# Patient Record
Sex: MALE | Race: WHITE | NOT HISPANIC OR LATINO
[De-identification: names, ages, dates, MRNs, and addresses within clinical notes are randomized per-mention and may not be internally consistent; named-entity substitution may affect disease eponyms.]

---

## 2022-12-22 ENCOUNTER — APPOINTMENT (OUTPATIENT)
Dept: ORTHOPEDIC SURGERY | Facility: CLINIC | Age: 52
End: 2022-12-22

## 2022-12-22 VITALS — BODY MASS INDEX: 45.1 KG/M2 | HEIGHT: 70 IN | WEIGHT: 315 LBS

## 2022-12-22 DIAGNOSIS — Z78.9 OTHER SPECIFIED HEALTH STATUS: ICD-10-CM

## 2022-12-22 DIAGNOSIS — E78.00 PURE HYPERCHOLESTEROLEMIA, UNSPECIFIED: ICD-10-CM

## 2022-12-22 DIAGNOSIS — I10 ESSENTIAL (PRIMARY) HYPERTENSION: ICD-10-CM

## 2022-12-22 DIAGNOSIS — M48.10 ANKYLOSING HYPEROSTOSIS [FORESTIER], SITE UNSPECIFIED: ICD-10-CM

## 2022-12-22 PROBLEM — Z00.00 ENCOUNTER FOR PREVENTIVE HEALTH EXAMINATION: Status: ACTIVE | Noted: 2022-12-22

## 2022-12-22 PROCEDURE — 72070 X-RAY EXAM THORAC SPINE 2VWS: CPT

## 2022-12-22 PROCEDURE — 99204 OFFICE O/P NEW MOD 45 MIN: CPT

## 2022-12-22 RX ORDER — SACUBITRIL AND VALSARTAN 97; 103 MG/1; MG/1
97-103 TABLET, FILM COATED ORAL
Refills: 0 | Status: ACTIVE | COMMUNITY

## 2022-12-22 RX ORDER — CARVEDILOL 25 MG/1
25 TABLET, FILM COATED ORAL
Refills: 0 | Status: ACTIVE | COMMUNITY

## 2022-12-22 RX ORDER — APIXABAN 5 MG/1
5 TABLET, FILM COATED ORAL
Refills: 0 | Status: ACTIVE | COMMUNITY

## 2022-12-22 RX ORDER — ATORVASTATIN CALCIUM 40 MG/1
40 TABLET, FILM COATED ORAL
Refills: 0 | Status: ACTIVE | COMMUNITY

## 2022-12-22 NOTE — HISTORY OF PRESENT ILLNESS
[Mid-back] : mid-back [Gradual] : gradual [0] : 0 [8] : 8 [Sharp] : sharp [Stabbing] : stabbing [Intermittent] : intermittent [Full time] : Work status: full time [de-identified] : Patient presents today with middle back pain for years with NKI. Denies previous treatments. Experiencing localized pain. Had GOUT last week, was on prednisone and felt some relief. Denies recent imaging.  [] : no [FreeTextEntry9] : prednisone [de-identified] : Construction

## 2022-12-22 NOTE — ASSESSMENT
[FreeTextEntry1] : Patient will begin Medrol.  Patient advised not to take any NSAIDs while taking the steroids. \par \par Patient will begin physical therapy. \par \par Recommend: - NSAID - Heating pad - Muscle relaxer - Core strengthening exercise - Hamstring stretching exercise Patient is given back rehabilitation exercise book. \par \par Follow up in 2 months

## 2023-02-16 ENCOUNTER — APPOINTMENT (OUTPATIENT)
Dept: ORTHOPEDIC SURGERY | Facility: CLINIC | Age: 53
End: 2023-02-16

## 2023-05-25 ENCOUNTER — APPOINTMENT (OUTPATIENT)
Dept: ORTHOPEDIC SURGERY | Facility: CLINIC | Age: 53
End: 2023-05-25
Payer: COMMERCIAL

## 2023-05-25 VITALS — BODY MASS INDEX: 45.1 KG/M2 | HEIGHT: 70 IN | WEIGHT: 315 LBS

## 2023-05-25 PROCEDURE — 20610 DRAIN/INJ JOINT/BURSA W/O US: CPT | Mod: LT

## 2023-05-25 PROCEDURE — 99214 OFFICE O/P EST MOD 30 MIN: CPT | Mod: 25

## 2023-05-25 PROCEDURE — 73030 X-RAY EXAM OF SHOULDER: CPT | Mod: LT

## 2023-05-25 NOTE — IMAGING
[Left] : left shoulder [There are no fractures, subluxations or dislocations. No significant abnormalities are seen] : There are no fractures, subluxations or dislocations. No significant abnormalities are seen [Type 2 acromion] : Type 2 acromion [AC Joint Arthrosis] : AC Joint Arthrosis [de-identified] : (Physical Exam: Shoulder)\par \par Laterality is left\par \par Patient is in no acute distress, alert and oriented\par Sensation is grossly intact to light touch in the hand\par Motor function is 5/5 in the hand\par Capillary refill is less than 2 seconds in the fingers\par Lymphadenopathy is not present\par Peripheral edema is not present\par \par Skin is intact \par Swelling is not present \par Atrophy is not present\par Scapular winging is not present\par Deformity of the AC joint is not present\par Deformity of the biceps is not present \par \par Bicipital groove tenderness is present \par AC joint tenderness is not present \par Scapulothoracic tenderness is not present \par Cervical paraspinal tenderness is not present \par \par Active forward elevation is 170\par Passive forward elevation is 175\par External rotation at the side is 80\par Internal rotation behind the back is to the level of T7 \par \par Forward elevation strength is 5-/5\par External rotation strength at the side is 5/5 \par Internal rotation strength at the side is 5/5 \par Deltoid strength of anterior, posterior and lateral heads is 5/5\par \par Wagner test is abnormal \par O’Franco’s test is abnormal\par Speed’s test is abnormal\par Cross body adduction test is normal\par Apprehension and relocation is normal\par Sulcus sign is normal\par Belly press test is normal\par Spurling’s test is normal\par  [FreeTextEntry1] : sclerosis of the greater tuberosity likely chronic. no fracture
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
## 2023-05-25 NOTE — DISCUSSION/SUMMARY
[de-identified] : (Assessment and Plan)\par \par History, clinical examination and imaging were most consistent with:\par -left rotator cuff partial tear and SLAP tear with biceps tendonitis\par \par The diagnosis was explained in detail. The potential non-surgical and surgical treatments were reviewed. The relative risks and benefits of each option were considered relative to the patient’s age, activity level, medical history, symptom severity and previously attempted treatments. \par \par The patient was advised to consult with their primary medical provider prior to initiation of any new medications to reduce the risk of adverse effects specific to their long-term home medications and medical history. The risk of gastrointestinal irritation and kidney injury specific to long-term NSAID use was discussed.   \par \par \par -Patient will proceed with formal PT.\par -Cortisone injection performed today for symptom relief.\par -Over the counter acetaminophen as needed for pain. \par -The added clinical utility of an MRI was discussed. The patient deferred further diagnostic testing at this time.\par -Follow up in 6 weeks. If symptoms persist consider MRI. \par \par \par (MDM) \par \par Problem Complexity\par -Moderate: chronic illness with exacerbation\par \par Risk\par -Moderate: injection\par \par -Patient has not been seen by another provider in my practice within the past 2 years who specializes in orthopedic sports medicine, shoulder or elbow surgery. \par \par (Procedure Note)\par \par Injection location was the:\par -left subacromial bursa and glenohumeral joint (half and half)\par \par Injection contents were: \par 40 mg of kenalog and 5 mL of 1% lidocaine\par \par Procedure Details: \par -Informed consent was obtained. Discussed possible risks of corticosteroid injection including hyperglycemia, infection and skin discoloration. \par -Discussed that due to infection risk, an interval between injection and any future surgery is 6 weeks for arthroscopy and 3 months for arthroplasty.\par -Injection performed using aseptic technique. Hemostasis was confirmed.\par -Procedure was tolerated well with no complications. \par \par

## 2023-07-06 ENCOUNTER — APPOINTMENT (OUTPATIENT)
Dept: ORTHOPEDIC SURGERY | Facility: CLINIC | Age: 53
End: 2023-07-06
Payer: COMMERCIAL

## 2023-07-06 VITALS — WEIGHT: 315 LBS | HEIGHT: 70 IN | BODY MASS INDEX: 45.1 KG/M2

## 2023-07-06 PROCEDURE — 99213 OFFICE O/P EST LOW 20 MIN: CPT

## 2023-07-06 NOTE — DISCUSSION/SUMMARY
[de-identified] : (Assessment and Plan)\par \par History, clinical examination and imaging were most consistent with:\par -left rotator cuff partial tear and SLAP tear with biceps tendonitis\par \par The diagnosis was explained in detail. The potential non-surgical and surgical treatments were reviewed. The relative risks and benefits of each option were considered relative to the patient’s age, activity level, medical history, symptom severity and previously attempted treatments. \par \par The patient was advised to consult with their primary medical provider prior to initiation of any new medications to reduce the risk of adverse effects specific to their long-term home medications and medical history. The risk of gastrointestinal irritation and kidney injury specific to long-term NSAID use was discussed. \par \par \par -Patient will continue with formal PT.\par -Over the counter acetaminophen as needed for pain. \par -Due to persistence of symptoms despite non-surgical treatment, an MRI will be obtained to evaluate his rotator cuff, biceps and labrum for tear. \par -Follow up when imaging completed. In the absence of full thickness rotator cuff tear we would likely discuss additional PT before considering surgical intervention. \par \par \par (MDM) \par \par Problem Complexity\par -Moderate: chronic illness with exacerbation\par \par Risk\par -Low: OTC medication\par

## 2023-07-06 NOTE — IMAGING
[de-identified] : (Physical Exam: Shoulder)\par \par Laterality is left\par \par Patient is in no acute distress, alert and oriented\par Sensation is grossly intact to light touch in the hand\par Motor function is 5/5 in the hand\par Capillary refill is less than 2 seconds in the fingers\par Lymphadenopathy is not present\par Peripheral edema is not present\par \par Skin is intact \par Swelling is not present \par Atrophy is not present\par Scapular winging is not present\par Deformity of the AC joint is not present\par Deformity of the biceps is not present \par \par Bicipital groove tenderness is present \par AC joint tenderness is not present \par Scapulothoracic tenderness is not present \par Cervical paraspinal tenderness is not present \par \par Active forward elevation is 175\par Passive forward elevation is 175\par External rotation at the side is 80\par Internal rotation behind the back is to the level of T7 \par \par Forward elevation strength is 5-/5\par External rotation strength at the side is 5/5 \par Internal rotation strength at the side is 5/5 \par Deltoid strength of anterior, posterior and lateral heads is 5/5\par \par Wagner test is abnormal \par O’Franco’s test is abnormal\par Speed’s test is abnormal\par Cross body adduction test is normal\par Apprehension and relocation is normal\par Sulcus sign is normal\par Belly press test is normal\par Spurling’s test is normal\par

## 2023-07-06 NOTE — HISTORY OF PRESENT ILLNESS
[de-identified] : Body part causing symptoms is the left shoulder\par Pain score at rest is 0\par Pain score during activity is 3\par Treatments since last visit include PT 2x a week and CSI last visit \par Compared to last visit, symptoms are improved\par He still has some pain with overhead activity

## 2023-07-14 ENCOUNTER — APPOINTMENT (OUTPATIENT)
Dept: MRI IMAGING | Facility: CLINIC | Age: 53
End: 2023-07-14

## 2023-07-18 ENCOUNTER — FORM ENCOUNTER (OUTPATIENT)
Age: 53
End: 2023-07-18

## 2023-07-19 ENCOUNTER — APPOINTMENT (OUTPATIENT)
Dept: MRI IMAGING | Facility: CLINIC | Age: 53
End: 2023-07-19
Payer: COMMERCIAL

## 2023-07-19 PROCEDURE — 73221 MRI JOINT UPR EXTREM W/O DYE: CPT | Mod: LT

## 2023-07-26 ENCOUNTER — APPOINTMENT (OUTPATIENT)
Dept: ORTHOPEDIC SURGERY | Facility: CLINIC | Age: 53
End: 2023-07-26
Payer: COMMERCIAL

## 2023-07-26 PROCEDURE — 99213 OFFICE O/P EST LOW 20 MIN: CPT

## 2023-07-26 NOTE — DATA REVIEWED
[MRI] : MRI [Left] : left [Shoulder] : shoulder [Report was reviewed and noted in the chart] : The report was reviewed and noted in the chart [I reviewed the films/CD and agree] : I reviewed the films/CD and agree [FreeTextEntry1] : full thickness rotator cuff supra tear with minimal retraction, no atrophy, infra and subscap inact, biceps tendonitis, labral fraying

## 2023-07-26 NOTE — HISTORY OF PRESENT ILLNESS
[de-identified] : Body part causing symptoms is the left shoulder\par Pain score at rest is 0/10\par Pain score during activity is 5/10\par Treatments since last visit include PT and tylenol\par Symptoms are improved, only has mild pain with lifting overhead\par \par \par  Impression: \par 1. Full-thickness tearing of the supraspinatus tendon insertion measuring 2 cm in anterior to posterior dimens\par with 1 cm of retraction and moderate surrounding bursitis without muscle atrophy.\par 2. Infraspinatus tendinitis with calcific deposits distally without discontinuity.\par 3. Mild effusion and capsular thickening, subscapularis tendinopathy, biceps tendinopathy, AC joint arthrosis\par and lateral acromial bone spurs with narrowing of the supraspinatus outlet.\par 4. No acute fracture.

## 2023-07-26 NOTE — IMAGING
[de-identified] : (Physical Exam: Shoulder)\par \par Laterality is left\par \par Patient is in no acute distress, alert and oriented\par Sensation is grossly intact to light touch in the hand\par Motor function is 5/5 in the hand\par Capillary refill is less than 2 seconds in the fingers\par Lymphadenopathy is not present\par Peripheral edema is not present\par \par Skin is intact \par Swelling is not present \par Atrophy is not present\par Scapular winging is not present\par Deformity of the AC joint is not present\par Deformity of the biceps is not present \par \par Bicipital groove tenderness is present \par AC joint tenderness is not present \par Scapulothoracic tenderness is not present \par Cervical paraspinal tenderness is not present \par \par Active forward elevation is 175\par Passive forward elevation is 175\par External rotation at the side is 80\par Internal rotation behind the back is to the level of T7 \par \par Forward elevation strength is 5-/5\par External rotation strength at the side is 5/5 \par Internal rotation strength at the side is 5/5 \par Deltoid strength of anterior, posterior and lateral heads is 5/5\par \par Wagner test is abnormal \par O’Franco’s test is abnormal\par Speed’s test is abnormal\par Cross body adduction test is normal\par Apprehension and relocation is normal\par Sulcus sign is normal\par Belly press test is normal\par Spurling’s test is normal\par

## 2023-07-26 NOTE — DISCUSSION/SUMMARY
[de-identified] : (Assessment and Plan)\par \par History, clinical examination and imaging were most consistent with:\par -left rotator cuff full thickness supraspinatus tear and biceps tendonitis\par \par The diagnosis was explained in detail. The potential non-surgical and surgical treatments were reviewed. The relative risks and benefits of each option were considered relative to the patient’s age, activity level, medical history, symptom severity and previously attempted treatments. \par \par The patient was advised to consult with their primary medical provider prior to initiation of any new medications to reduce the risk of adverse effects specific to their long-term home medications and medical history. The risk of gastrointestinal irritation and kidney injury specific to long-term NSAID use was discussed. \par \par -Discussed option for rotator cuff repair, and associated rehabilitation. Patient would like to avoid surgical intervention at this time while he continues to work, as the symptoms are not functionally limiting. We discussed risk of atrophy and tear progression over time. \par -Patient will continue with formal PT and transition to HEP. \par -Over the counter acetaminophen as needed for pain.\par -Follow up in 3 months, consider repeat injection versus surgical discussion. Patient would consider surgery over the winter if symptoms worsen. \par \par (MDM) \par \par Problem Complexity\par -Moderate: chronic illness with exacerbation\par \par Risk\par -Low: OTC medication\par

## 2023-10-25 ENCOUNTER — APPOINTMENT (OUTPATIENT)
Dept: ORTHOPEDIC SURGERY | Facility: CLINIC | Age: 53
End: 2023-10-25

## 2024-02-07 ENCOUNTER — APPOINTMENT (OUTPATIENT)
Dept: ORTHOPEDIC SURGERY | Facility: CLINIC | Age: 54
End: 2024-02-07

## 2024-03-07 ENCOUNTER — APPOINTMENT (OUTPATIENT)
Dept: ORTHOPEDIC SURGERY | Facility: CLINIC | Age: 54
End: 2024-03-07
Payer: COMMERCIAL

## 2024-03-07 VITALS — HEIGHT: 69 IN | WEIGHT: 307 LBS | BODY MASS INDEX: 45.47 KG/M2

## 2024-03-07 PROCEDURE — 99214 OFFICE O/P EST MOD 30 MIN: CPT

## 2024-03-07 NOTE — DISCUSSION/SUMMARY
[de-identified] : History, clinical examination and imaging were most consistent with: -left rotator cuff full thickness supraspinatus tear and biceps tendonitis   The diagnosis was explained in detail. The potential non-surgical and surgical treatments were reviewed. The relative risks and benefits of each option were considered relative to the patients age, activity level, medical history, symptom severity and previously attempted treatments.   -Surgical treatment was selected. The patient failed to improve with non-surgical treatment. Their symptoms have placed a significant burden on their quality of life. The risks, benefits and alternatives of the planned surgical procedure were discussed, along with the expected timeline for rehabilitation and specifics regarding the most common complications. The patient agreed to participate in post-operative physical therapy. 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 Recommend cervical evaluation with Dr. Metz pre-operatively to evaluate for precautions within the beach chair position.    -Procedure: left shoulder RCR, SAD, ANETTE, BT -Clearances: medical, cardiac for afib -Diabetic: no -Smoker: no -Follow up: 1-2 weeks after surgery   (MDM)   Problem Complexity -Moderate: chronic illness with exacerbation   Risk -Moderate: pre-surgical discussion

## 2024-03-07 NOTE — IMAGING
[de-identified] : (Physical Exam: Shoulder)  Laterality is left  Patient is in no acute distress, alert and oriented Sensation is grossly intact to light touch in the hand Motor function is 5/5 in the hand Capillary refill is less than 2 seconds in the fingers Lymphadenopathy is not present Peripheral edema is not present  Skin is intact  Swelling is not present  Atrophy is not present Scapular winging is not present Deformity of the AC joint is not present Deformity of the biceps is not present   Bicipital groove tenderness is present  AC joint tenderness is not present  Scapulothoracic tenderness is not present  Cervical paraspinal tenderness is not present   Active forward elevation is 120 Passive forward elevation is 160 External rotation at the side is 80 Internal rotation behind the back is to the level of T7   Forward elevation strength is 4+/5 External rotation strength at the side is 5-/5  Internal rotation strength at the side is 5/5  Deltoid strength of anterior, posterior and lateral heads is 5/5  Wagner test is abnormal  [place]'s test is abnormal Speed's test is abnormal Cross body adduction test is normal Apprehension and relocation is normal Sulcus sign is normal Belly press test is normal Spurling's test is normal

## 2024-03-07 NOTE — HISTORY OF PRESENT ILLNESS
[de-identified] : 03/07/24: Follow up for the left shoulder. Patient is still experiencing pain, he has stopped attending PT due to pain. worsening weakness.    Body part causing symptoms is the left shoulder Pain score at rest is 0/10 Pain score during activity is 5/10 Treatments since last visit include PT and tylenol Symptoms are improved, only has mild pain with lifting overhead   Impression:  1. Full-thickness tearing of the supraspinatus tendon insertion measuring 2 cm in anterior to posterior dimens with 1 cm of retraction and moderate surrounding bursitis without muscle atrophy. 2. Infraspinatus tendinitis with calcific deposits distally without discontinuity. 3. Mild effusion and capsular thickening, subscapularis tendinopathy, biceps tendinopathy, AC joint arthrosis and lateral acromial bone spurs with narrowing of the supraspinatus outlet. 4. No acute fracture.

## 2024-03-07 NOTE — DATA REVIEWED
[Left] : left [MRI] : MRI [Report was reviewed and noted in the chart] : The report was reviewed and noted in the chart [Shoulder] : shoulder [I reviewed the films/CD and agree] : I reviewed the films/CD and agree [FreeTextEntry1] : full thickness rotator cuff supra tear with minimal retraction, no atrophy, infra and subscap inact, biceps tendonitis, labral fraying

## 2024-04-03 ENCOUNTER — APPOINTMENT (OUTPATIENT)
Dept: ORTHOPEDIC SURGERY | Facility: CLINIC | Age: 54
End: 2024-04-03
Payer: COMMERCIAL

## 2024-04-03 DIAGNOSIS — M48.02 SPINAL STENOSIS, CERVICAL REGION: ICD-10-CM

## 2024-04-03 DIAGNOSIS — M62.838 OTHER MUSCLE SPASM: ICD-10-CM

## 2024-04-03 DIAGNOSIS — M47.22 OTHER SPONDYLOSIS WITH RADICULOPATHY, CERVICAL REGION: ICD-10-CM

## 2024-04-03 DIAGNOSIS — M47.812 SPONDYLOSIS W/OUT MYELOPATHY OR RADICULOPATHY, CERVICAL REGION: ICD-10-CM

## 2024-04-03 PROCEDURE — 99214 OFFICE O/P EST MOD 30 MIN: CPT

## 2024-04-03 PROCEDURE — 72040 X-RAY EXAM NECK SPINE 2-3 VW: CPT

## 2024-04-03 NOTE — PHYSICAL EXAM
[de-identified] : Constitutional: Well groomed and developed.  Respiratory: Normal, unlabored breathing. No use of accessory muscles.  Skin: No rashes or ulcers. Skin warm and dry.  Psychiatric: Oriented to time, place, person and event. No acute distress.   Neck:    Posture: normal   AROM:  Flexion- chin to chest  Extension- 20  R rotation- 60  L rotation- 60  Moderate pain with neck ROM.  Gait: Heel to toe Patient able to walk on toes and heels.   Tenderness:  Cervical: Mild tenderness on palpation    Motor:                        R               L              UE:                   Deltoid            5                4 WE                 5                5 Triceps          5                5                5                5 Intrinsics       5                5 Biceps          5                5                                  R         L DTR:  Biceps:                     2+        2+ Brachioradialis:        2+        2+ Triceps:                   2+         2+   Sensory: Light touch sensation intact on C5-T1  Spurling sign: Normal  Babinski's sign: Negative Bilaterally Anthony's sign: Negative Bilaterally  Abduction sign: Negative Bilaterally

## 2024-04-03 NOTE — HISTORY OF PRESENT ILLNESS
[Neck] : neck [7] : 7 [5] : 5 [Dull/Aching] : dull/aching [Constant] : constant [Standing] : standing [Full time] : Work status: full time [de-identified] : Patient presents today with neck pain with NKI. Patient denies previous treatments. Patient saw another orthopedic and was advised to go to PT. Patient states his pain is localized. Patient denies having headaches or issues with his balance. Patient denies taking pain medication. Patient had thoracic spine MRI at . [] : no [de-identified] :  thoracic spine MRI at  [de-identified] : [employer]

## 2024-04-03 NOTE — ASSESSMENT
[FreeTextEntry1] : 55 yo male presents today for eval of his neck pain. X-rays today show mild DDD. Scheduled for rotator cuff surgery with [facility] in July. From a neck standpoint, no contraindications for planned shoulder surgery. I discussed with patient that for his neck pain, he may proceed with PT in the meantime until surgery.   - Recommend physical therapy to regain range of motion, strengthening and symptomatic improvement. Prescription given in office today.   - Recommend NSAIDs PRN - Recommend heating pad use to decrease muscle spasm - Discussed the importance of home exercises, including but not limited to neck stretching and cervical traction   Patient was educated on their diagnosis today. All questions answered and patient expressed understanding.  Follow up PRN

## 2024-05-01 ENCOUNTER — APPOINTMENT (OUTPATIENT)
Dept: PULMONOLOGY | Facility: CLINIC | Age: 54
End: 2024-05-01
Payer: COMMERCIAL

## 2024-05-01 VITALS
HEART RATE: 73 BPM | RESPIRATION RATE: 16 BRPM | WEIGHT: 286 LBS | SYSTOLIC BLOOD PRESSURE: 124 MMHG | HEIGHT: 69 IN | DIASTOLIC BLOOD PRESSURE: 64 MMHG | OXYGEN SATURATION: 96 % | BODY MASS INDEX: 42.36 KG/M2

## 2024-05-01 DIAGNOSIS — I42.9 CARDIOMYOPATHY, UNSPECIFIED: ICD-10-CM

## 2024-05-01 DIAGNOSIS — I48.91 UNSPECIFIED ATRIAL FIBRILLATION: ICD-10-CM

## 2024-05-01 DIAGNOSIS — E78.00 PURE HYPERCHOLESTEROLEMIA, UNSPECIFIED: ICD-10-CM

## 2024-05-01 DIAGNOSIS — M10.9 GOUT, UNSPECIFIED: ICD-10-CM

## 2024-05-01 PROCEDURE — 99204 OFFICE O/P NEW MOD 45 MIN: CPT

## 2024-05-01 RX ORDER — METHYLPREDNISOLONE 4 MG/1
4 TABLET ORAL
Qty: 1 | Refills: 0 | Status: DISCONTINUED | COMMUNITY
Start: 2022-12-22 | End: 2024-05-01

## 2024-05-01 NOTE — HISTORY OF PRESENT ILLNESS
[Awakes Unrefreshed] : awakes unrefreshed [Awakes with Dry Mouth] : does not awaken with dry mouth [Awakes with Headache] : does not awaken with headache [Snoring] : snoring [Witnessed Apneas] : witnessed apneas [TextBox_77] : 8526 [TextBox_79] : 0170 [TextBox_81] : 10 [TextBox_83] : 1hr [TextBox_89] : 1 [TextBox_165] : Dx ADWOA 6yrs Failed cpap [ESS] : 8

## 2024-05-01 NOTE — DISCUSSION/SUMMARY
[Obstructive Sleep Apnea] : obstructive sleep apnea [Sleep Study] : sleep study [Alcohol Avoidance] : alcohol avoidance [Sedative Avoidance] : sedative avoidance [Smoking Cessation] : smoking cessation [Intra-Oral Device] : intra-oral device [de-identified] : unattended sleep study

## 2024-05-01 NOTE — CONSULT LETTER
[Dear  ___] : Dear  [unfilled], [Consult Letter:] : I had the pleasure of evaluating your patient, [unfilled]. [Please see my note below.] : Please see my note below. [Consult Closing:] : Thank you very much for allowing me to participate in the care of this patient.  If you have any questions, please do not hesitate to contact me. [Sincerely,] : Sincerely, [FreeTextEntry3] : Gamal Almeida MD FCCP Pulmonary/Critical Care/Sleep Medicine Department of Internal Medicine  [facility]

## 2024-05-15 ENCOUNTER — OUTPATIENT (OUTPATIENT)
Dept: OUTPATIENT SERVICES | Facility: HOSPITAL | Age: 54
LOS: 1 days | End: 2024-05-15
Payer: COMMERCIAL

## 2024-05-15 ENCOUNTER — TRANSCRIPTION ENCOUNTER (OUTPATIENT)
Age: 54
End: 2024-05-15

## 2024-05-15 DIAGNOSIS — G47.33 OBSTRUCTIVE SLEEP APNEA (ADULT) (PEDIATRIC): ICD-10-CM

## 2024-05-15 PROCEDURE — 95800 SLP STDY UNATTENDED: CPT

## 2024-05-15 PROCEDURE — 95800 SLP STDY UNATTENDED: CPT | Mod: 26

## 2024-05-23 ENCOUNTER — APPOINTMENT (OUTPATIENT)
Dept: PULMONOLOGY | Facility: CLINIC | Age: 54
End: 2024-05-23
Payer: COMMERCIAL

## 2024-05-23 DIAGNOSIS — G47.33 OBSTRUCTIVE SLEEP APNEA (ADULT) (PEDIATRIC): ICD-10-CM

## 2024-05-23 PROCEDURE — 99213 OFFICE O/P EST LOW 20 MIN: CPT

## 2024-05-23 RX ORDER — ZOLPIDEM TARTRATE 10 MG/1
10 TABLET ORAL
Qty: 1 | Refills: 0 | Status: ACTIVE | OUTPATIENT
Start: 2024-05-23

## 2024-05-23 NOTE — DISCUSSION/SUMMARY
[FreeTextEntry1] : 54-year-old male seen today for the above.  Patient's findings on unattended sleep study very mild.  Because of the patient's history of atrial fibrillation I feel that he should undergo an attended sleep study due to the under predictive tendency of unattended studies.

## 2024-05-23 NOTE — HISTORY OF PRESENT ILLNESS
[Medical Office: ([facility])___] : at the medical office located in  [Verbal consent obtained from patient] : the patient, [unfilled] [TextBox_4] : Recurrent A-fib status post ablation [Awakes Unrefreshed] : awakes unrefreshed [Awakes with Dry Mouth] : does not awaken with dry mouth [Awakes with Headache] : does not awaken with headache [Snoring] : snoring [Witnessed Apneas] : witnessed apneas [Home] : home [TextBox_77] : 0176 [TextBox_79] : 7286 [TextBox_81] : 10 [TextBox_83] : 1hr [TextBox_89] : 1 [TextBox_100] : 5/15/2024 [TextBox_108] : 7.2 (3% criteria) [TextBox_120] : 4.4 AHI at 4% criteria, total sleep time 6003 minutes [TextBox_165] : Dx ADWOA 6yrs Failed cpap [ESS] : 8

## 2024-05-23 NOTE — CONSULT LETTER
[Dear  ___] : Dear  [unfilled], [Consult Letter:] : I had the pleasure of evaluating your patient, [unfilled]. [Please see my note below.] : Please see my note below. [Consult Closing:] : Thank you very much for allowing me to participate in the care of this patient.  If you have any questions, please do not hesitate to contact me. [Sincerely,] : Sincerely, [FreeTextEntry3] : Gamal Almeida MD FCCP Pulmonary/Critical Care/Sleep Medicine Department of Internal Medicine  [facility]

## 2024-05-23 NOTE — END OF VISIT
[FreeTextEntry3] : Visit initiated at the request of the patient or caregiver. I discussed with patient  the limitations of telemedicine encounters, including risks associated with the technology platform, technical difficulties, data security, and a limited physical exam. There is also a limitation in performing diagnostic procedures and patient may need further testing and workup to arrive at a diagnosis or treatment plan. We discussed this will be billed as a visit.  Over 50% of the visit was spent on counseling and coordination of care. [Time Spent: ___ minutes] : I have spent [unfilled] minutes of time on the encounter.

## 2024-06-05 ENCOUNTER — APPOINTMENT (OUTPATIENT)
Dept: ORTHOPEDIC SURGERY | Facility: CLINIC | Age: 54
End: 2024-06-05

## 2024-06-13 ENCOUNTER — APPOINTMENT (OUTPATIENT)
Dept: ORTHOPEDIC SURGERY | Facility: CLINIC | Age: 54
End: 2024-06-13
Payer: COMMERCIAL

## 2024-06-13 VITALS — BODY MASS INDEX: 41.18 KG/M2 | HEIGHT: 69 IN | WEIGHT: 278 LBS

## 2024-06-13 DIAGNOSIS — M75.122 COMPLETE ROTATOR CUFF TEAR OR RUPTURE OF LEFT SHOULDER, NOT SPECIFIED AS TRAUMATIC: ICD-10-CM

## 2024-06-13 PROCEDURE — 99214 OFFICE O/P EST MOD 30 MIN: CPT

## 2024-06-13 RX ORDER — TIRZEPATIDE 15 MG/.5ML
15 INJECTION, SOLUTION SUBCUTANEOUS
Refills: 0 | Status: ACTIVE | COMMUNITY

## 2024-06-13 RX ORDER — ASPIRIN 81 MG
81 TABLET, DELAYED RELEASE (ENTERIC COATED) ORAL
Refills: 0 | Status: ACTIVE | COMMUNITY

## 2024-06-13 NOTE — IMAGING
[de-identified] : (Physical Exam: Shoulder)  Laterality is left  Patient is in no acute distress, alert and oriented Sensation is grossly intact to light touch in the hand Motor function is 5/5 in the hand Capillary refill is less than 2 seconds in the fingers Lymphadenopathy is not present Peripheral edema is not present  Skin is intact  Swelling is not present  Atrophy is not present Scapular winging is not present Deformity of the AC joint is not present Deformity of the biceps is not present   Bicipital groove tenderness is present  AC joint tenderness is not present  Scapulothoracic tenderness is not present  Cervical paraspinal tenderness is not present   Active forward elevation is 120 Passive forward elevation is 160 External rotation at the side is 80 Internal rotation behind the back is to the level of T7   Forward elevation strength is 4+/5 External rotation strength at the side is 5-/5  Internal rotation strength at the side is 5/5  Deltoid strength of anterior, posterior and lateral heads is 5/5  Wagner test is abnormal  [place]'s test is abnormal Speed's test is abnormal Cross body adduction test is normal Apprehension and relocation is normal Sulcus sign is normal Belly press test is normal Spurling's test is normal

## 2024-06-13 NOTE — HISTORY OF PRESENT ILLNESS
[de-identified] : 06/13/24: Follow up for the left shoulder. Patient reports he is still having pain. Patient is scheduled for surgery on 07/17. he has additional questions about the surgery today. he saw dr rojo and cervical spine cleared for surgery.   03/07/24: Follow up for the left shoulder. Patient is still experiencing pain, he has stopped attending PT due to pain. worsening weakness.    Body part causing symptoms is the left shoulder Pain score at rest is 0/10 Pain score during activity is 5/10 Treatments since last visit include PT and tylenol Symptoms are improved, only has mild pain with lifting overhead   Impression:  1. Full-thickness tearing of the supraspinatus tendon insertion measuring 2 cm in anterior to posterior dimens with 1 cm of retraction and moderate surrounding bursitis without muscle atrophy. 2. Infraspinatus tendinitis with calcific deposits distally without discontinuity. 3. Mild effusion and capsular thickening, subscapularis tendinopathy, biceps tendinopathy, AC joint arthrosis and lateral acromial bone spurs with narrowing of the supraspinatus outlet. 4. No acute fracture.

## 2024-06-13 NOTE — DISCUSSION/SUMMARY
[de-identified] : History, clinical examination and imaging were most consistent with: -left rotator cuff full thickness supraspinatus tear and biceps tendonitis   The diagnosis was explained in detail. The potential non-surgical and surgical treatments were reviewed. The relative risks and benefits of each option were considered relative to the patients age, activity level, medical history, symptom severity and previously attempted treatments.   -Plan to proceed with surgery as scheduled with left shoulder RCR, SAD, ANETTE, BT. -Over the counter tylenol as needed for pain. -Follow up post-operatively.    (MDM)   Problem Complexity -Moderate: chronic illness with exacerbation   Risk -Moderate: pre-surgical discussion

## 2024-07-16 RX ORDER — OXYCODONE 5 MG/1
5 TABLET ORAL EVERY 6 HOURS
Qty: 20 | Refills: 0 | Status: ACTIVE | COMMUNITY
Start: 2024-07-16 | End: 1900-01-01

## 2024-07-16 RX ORDER — FAMOTIDINE 20 MG/1
20 TABLET, FILM COATED ORAL
Qty: 14 | Refills: 0 | Status: ACTIVE | COMMUNITY
Start: 2024-07-16 | End: 1900-01-01

## 2024-07-16 RX ORDER — POLYETHYLENE GLYCOL 3350 17 G/17G
17 POWDER, FOR SOLUTION ORAL DAILY
Qty: 7 | Refills: 0 | Status: ACTIVE | COMMUNITY
Start: 2024-07-16 | End: 1900-01-01

## 2024-07-17 ENCOUNTER — APPOINTMENT (OUTPATIENT)
Dept: ORTHOPEDIC SURGERY | Facility: HOSPITAL | Age: 54
End: 2024-07-17

## 2024-07-31 ENCOUNTER — APPOINTMENT (OUTPATIENT)
Dept: ORTHOPEDIC SURGERY | Facility: HOSPITAL | Age: 54
End: 2024-07-31
Payer: COMMERCIAL

## 2024-07-31 PROCEDURE — 29826 SHO ARTHRS SRG DECOMPRESSION: CPT | Mod: LT

## 2024-07-31 PROCEDURE — 23430 REPAIR BICEPS TENDON: CPT | Mod: LT

## 2024-07-31 PROCEDURE — 29827 SHO ARTHRS SRG RT8TR CUF RPR: CPT | Mod: LT

## 2024-07-31 PROCEDURE — 29823 SHO ARTHRS SRG XTNSV DBRDMT: CPT | Mod: LT

## 2024-08-01 ENCOUNTER — APPOINTMENT (OUTPATIENT)
Dept: ORTHOPEDIC SURGERY | Facility: CLINIC | Age: 54
End: 2024-08-01

## 2024-08-20 ENCOUNTER — APPOINTMENT (OUTPATIENT)
Dept: ORTHOPEDIC SURGERY | Facility: CLINIC | Age: 54
End: 2024-08-20
Payer: COMMERCIAL

## 2024-08-20 DIAGNOSIS — Z47.89 ENCOUNTER FOR OTHER ORTHOPEDIC AFTERCARE: ICD-10-CM

## 2024-08-20 PROCEDURE — 99024 POSTOP FOLLOW-UP VISIT: CPT

## 2024-08-20 NOTE — DISCUSSION/SUMMARY
[de-identified] : Patient is making expected post-operative progress. All questions were answered.   -Proceed with physical therapy as per the post-operative protocol. -Continue sling until 6 weeks post-operatively. -Ibuprofen and acetaminophen as needed for pain. -Aspirin for DVT prophylaxis until 1 month post-operatively -Follow up in 6 weeks without XR

## 2024-08-20 NOTE — HISTORY OF PRESENT ILLNESS
[de-identified] : Date of surgery was 07/31/2024 Surgery performed was left RCR, SAD, ANETTE, BT Current pain score is 5/10 Current medications taken are  aspirin, oxycodone prn at night Physical therapy is not started yet

## 2024-08-20 NOTE — IMAGING
[de-identified] : Laterality is left   Patient is in no acute distress, alert and oriented Sensation is grossly intact to light touch in the hand Axillary sensation is intact in the shoulder Motor function is 5/5 in the hand Deltoid and biceps are firing Capillary refill is less than 2 seconds in the fingers Lymphadenopathy is not present Peripheral edema is not present   Skin is intact Incisions are healing well Swelling is minimal

## 2024-09-15 ENCOUNTER — OUTPATIENT (OUTPATIENT)
Dept: OUTPATIENT SERVICES | Facility: HOSPITAL | Age: 54
LOS: 1 days | End: 2024-09-15
Payer: COMMERCIAL

## 2024-09-15 DIAGNOSIS — G47.33 OBSTRUCTIVE SLEEP APNEA (ADULT) (PEDIATRIC): ICD-10-CM

## 2024-09-15 PROCEDURE — 95810 POLYSOM 6/> YRS 4/> PARAM: CPT

## 2024-09-15 PROCEDURE — 95811 POLYSOM 6/>YRS CPAP 4/> PARM: CPT | Mod: 26

## 2024-09-26 ENCOUNTER — APPOINTMENT (OUTPATIENT)
Dept: PULMONOLOGY | Facility: CLINIC | Age: 54
End: 2024-09-26
Payer: COMMERCIAL

## 2024-09-26 VITALS
HEART RATE: 93 BPM | WEIGHT: 253 LBS | RESPIRATION RATE: 16 BRPM | SYSTOLIC BLOOD PRESSURE: 118 MMHG | HEIGHT: 69 IN | DIASTOLIC BLOOD PRESSURE: 80 MMHG | BODY MASS INDEX: 37.47 KG/M2 | OXYGEN SATURATION: 97 %

## 2024-09-26 DIAGNOSIS — Z71.89 OTHER SPECIFIED COUNSELING: ICD-10-CM

## 2024-09-26 DIAGNOSIS — G47.33 OBSTRUCTIVE SLEEP APNEA (ADULT) (PEDIATRIC): ICD-10-CM

## 2024-09-26 PROCEDURE — G2211 COMPLEX E/M VISIT ADD ON: CPT | Mod: NC

## 2024-09-26 PROCEDURE — 99214 OFFICE O/P EST MOD 30 MIN: CPT

## 2024-09-26 NOTE — HISTORY OF PRESENT ILLNESS
[Awakes Unrefreshed] : awakes unrefreshed [Snoring] : snoring [Witnessed Apneas] : witnessed apneas [Home] : home [TextBox_4] : Recurrent A-fib status post ablation [Awakes with Dry Mouth] : does not awaken with dry mouth [Awakes with Headache] : does not awaken with headache [TextBox_77] : 5801 [TextBox_79] : 9007 [TextBox_81] : 10 [TextBox_83] : 1hr [TextBox_89] : 1 [TextBox_100] : 9/15/2024 [TextBox_108] : 16.5(REM43.3) [TextBox_112] : 0.8% [TextBox_116] : 85 [TextBox_120] : TST 309minutes [TextBox_165] : Dx ADWOA 6yrs Failed cpap [ESS] : 8

## 2024-09-26 NOTE — CONSULT LETTER
[Dear  ___] : Dear  [unfilled], [Consult Letter:] : I had the pleasure of evaluating your patient, [unfilled]. [Please see my note below.] : Please see my note below. [Consult Closing:] : Thank you very much for allowing me to participate in the care of this patient.  If you have any questions, please do not hesitate to contact me. [Sincerely,] : Sincerely, [FreeTextEntry3] : Gamal Almeida MD FCCP Pulmonary/Critical Care/Sleep Medicine Department of Internal Medicine  [facility]

## 2024-09-26 NOTE — DISCUSSION/SUMMARY
[Obstructive Sleep Apnea] : obstructive sleep apnea [Moderate] : moderate in severity [None] : There are no changes in medication management [Alcohol Avoidance] : alcohol avoidance [Sedative Avoidance] : sedative avoidance [Weight Loss Program] : weight loss program [CPAP] : CPAP [de-identified] : CPAP was initiated with full explanation of the physiology behind treatment, compliance requirements and care of equipment. [FreeTextEntry1] : Pt fitted with and supplied with a Quikly F40 SW FFM

## 2024-10-03 ENCOUNTER — APPOINTMENT (OUTPATIENT)
Dept: ORTHOPEDIC SURGERY | Facility: CLINIC | Age: 54
End: 2024-10-03
Payer: COMMERCIAL

## 2024-10-03 DIAGNOSIS — Z47.89 ENCOUNTER FOR OTHER ORTHOPEDIC AFTERCARE: ICD-10-CM

## 2024-10-03 DIAGNOSIS — M75.122 COMPLETE ROTATOR CUFF TEAR OR RUPTURE OF LEFT SHOULDER, NOT SPECIFIED AS TRAUMATIC: ICD-10-CM

## 2024-10-03 PROCEDURE — 99024 POSTOP FOLLOW-UP VISIT: CPT

## 2024-10-03 NOTE — HISTORY OF PRESENT ILLNESS
[de-identified] : 10/03/2024: f/u for left shoulder. PT 2x a week and HEP. Symptoms are improved since last visit, but reports shoulder is still stiff. he recalls he was very stiff preop  Date of surgery was 07/31/2024 Surgery performed was left RCR, SAD, ANETTE, BT Current pain score is 5/10 Current medications taken are aspirin, oxycodone prn at night Physical therapy is not started yet

## 2024-10-03 NOTE — DISCUSSION/SUMMARY
[de-identified] : (Impression) Patient is making expected post-operative progress. Discussed importance of PT for additional stretching to improve his mobility.   (Plan)  -Physical therapy as per the post-operative protocol. -Acetaminophen as needed for pain. -Follow up in 8 weeks, consider CSI if stiffness persists.

## 2024-10-03 NOTE — IMAGING
[de-identified] : (Exam: Shoulder)  Laterality is left   Patient is in no acute distress, alert and oriented Sensation is grossly intact to light touch in the hand Axillary sensation is intact in the shoulder Motor function is 5/5 in the hand Deltoid and biceps are firing Capillary refill is less than 2 seconds in the fingers Lymphadenopathy is not present Peripheral edema is not present   Skin is intact Incisions are healed   Active forward elevation is 80 Passive forward elevation is 120 External rotation at the side is 30 Internal rotation behind the back is to the level of sacrum   Forward elevation strength is 4/5 External rotation strength at the side is 4/5 Internal rotation strength at the side is 4/5 Deltoid anterior, posterior and lateral heads are firing

## 2024-11-21 ENCOUNTER — APPOINTMENT (OUTPATIENT)
Dept: ORTHOPEDIC SURGERY | Facility: CLINIC | Age: 54
End: 2024-11-21
Payer: COMMERCIAL

## 2024-11-21 DIAGNOSIS — Z47.89 ENCOUNTER FOR OTHER ORTHOPEDIC AFTERCARE: ICD-10-CM

## 2024-11-21 PROCEDURE — 99213 OFFICE O/P EST LOW 20 MIN: CPT

## 2024-12-09 ENCOUNTER — APPOINTMENT (OUTPATIENT)
Dept: PULMONOLOGY | Facility: CLINIC | Age: 54
End: 2024-12-09

## 2025-01-16 ENCOUNTER — APPOINTMENT (OUTPATIENT)
Dept: PULMONOLOGY | Facility: CLINIC | Age: 55
End: 2025-01-16
Payer: COMMERCIAL

## 2025-01-16 VITALS — HEIGHT: 69 IN | WEIGHT: 230 LBS | BODY MASS INDEX: 34.07 KG/M2

## 2025-01-16 VITALS
DIASTOLIC BLOOD PRESSURE: 80 MMHG | RESPIRATION RATE: 16 BRPM | HEART RATE: 91 BPM | OXYGEN SATURATION: 97 % | SYSTOLIC BLOOD PRESSURE: 110 MMHG

## 2025-01-16 DIAGNOSIS — G47.33 OBSTRUCTIVE SLEEP APNEA (ADULT) (PEDIATRIC): ICD-10-CM

## 2025-01-16 DIAGNOSIS — Z71.89 OTHER SPECIFIED COUNSELING: ICD-10-CM

## 2025-01-16 PROCEDURE — 99214 OFFICE O/P EST MOD 30 MIN: CPT

## 2025-01-16 PROCEDURE — G2211 COMPLEX E/M VISIT ADD ON: CPT | Mod: NC

## 2025-01-16 RX ORDER — OMEPRAZOLE 10 MG/1
10 CAPSULE, DELAYED RELEASE ORAL
Refills: 0 | Status: ACTIVE | COMMUNITY

## 2025-01-23 ENCOUNTER — APPOINTMENT (OUTPATIENT)
Dept: ORTHOPEDIC SURGERY | Facility: CLINIC | Age: 55
End: 2025-01-23
Payer: COMMERCIAL

## 2025-01-23 DIAGNOSIS — M75.122 COMPLETE ROTATOR CUFF TEAR OR RUPTURE OF LEFT SHOULDER, NOT SPECIFIED AS TRAUMATIC: ICD-10-CM

## 2025-01-23 DIAGNOSIS — Z47.89 ENCOUNTER FOR OTHER ORTHOPEDIC AFTERCARE: ICD-10-CM

## 2025-01-23 PROCEDURE — 99213 OFFICE O/P EST LOW 20 MIN: CPT

## 2025-01-29 ENCOUNTER — APPOINTMENT (OUTPATIENT)
Dept: ORTHOPEDIC SURGERY | Facility: CLINIC | Age: 55
End: 2025-01-29
Payer: COMMERCIAL

## 2025-01-29 VITALS — WEIGHT: 230 LBS | BODY MASS INDEX: 34.07 KG/M2 | HEIGHT: 69 IN

## 2025-01-29 DIAGNOSIS — M46.1 SACROILIITIS, NOT ELSEWHERE CLASSIFIED: ICD-10-CM

## 2025-01-29 DIAGNOSIS — M47.816 SPONDYLOSIS W/OUT MYELOPATHY OR RADICULOPATHY, LUMBAR REGION: ICD-10-CM

## 2025-01-29 DIAGNOSIS — M48.10 ANKYLOSING HYPEROSTOSIS [FORESTIER], SITE UNSPECIFIED: ICD-10-CM

## 2025-01-29 DIAGNOSIS — M47.22 OTHER SPONDYLOSIS WITH RADICULOPATHY, CERVICAL REGION: ICD-10-CM

## 2025-01-29 PROCEDURE — 99203 OFFICE O/P NEW LOW 30 MIN: CPT

## 2025-01-29 PROCEDURE — 72200 X-RAY EXAM SI JOINTS: CPT

## 2025-01-29 PROCEDURE — 72070 X-RAY EXAM THORAC SPINE 2VWS: CPT

## 2025-01-29 PROCEDURE — 72100 X-RAY EXAM L-S SPINE 2/3 VWS: CPT

## 2025-01-29 RX ORDER — METHYLPREDNISOLONE 4 MG/1
4 TABLET ORAL
Qty: 1 | Refills: 0 | Status: ACTIVE | COMMUNITY
Start: 2025-01-29 | End: 1900-01-01